# Patient Record
Sex: FEMALE | Race: WHITE | NOT HISPANIC OR LATINO | URBAN - METROPOLITAN AREA
[De-identification: names, ages, dates, MRNs, and addresses within clinical notes are randomized per-mention and may not be internally consistent; named-entity substitution may affect disease eponyms.]

---

## 2022-03-02 ENCOUNTER — EMERGENCY (EMERGENCY)
Facility: HOSPITAL | Age: 57
LOS: 1 days | Discharge: ANOTHER HOSPITAL | End: 2022-03-02
Attending: EMERGENCY MEDICINE | Admitting: EMERGENCY MEDICINE
Payer: COMMERCIAL

## 2022-03-02 ENCOUNTER — EMERGENCY (EMERGENCY)
Facility: HOSPITAL | Age: 57
LOS: 1 days | Discharge: HOME | End: 2022-03-02
Attending: EMERGENCY MEDICINE
Payer: COMMERCIAL

## 2022-03-02 VITALS
TEMPERATURE: 98 F | RESPIRATION RATE: 18 BRPM | SYSTOLIC BLOOD PRESSURE: 140 MMHG | HEART RATE: 84 BPM | OXYGEN SATURATION: 99 % | DIASTOLIC BLOOD PRESSURE: 76 MMHG

## 2022-03-02 VITALS
WEIGHT: 125 LBS | RESPIRATION RATE: 18 BRPM | OXYGEN SATURATION: 100 % | HEART RATE: 85 BPM | SYSTOLIC BLOOD PRESSURE: 139 MMHG | DIASTOLIC BLOOD PRESSURE: 82 MMHG | HEIGHT: 62 IN | TEMPERATURE: 98 F

## 2022-03-02 VITALS
HEART RATE: 85 BPM | TEMPERATURE: 99 F | RESPIRATION RATE: 18 BRPM | OXYGEN SATURATION: 99 % | DIASTOLIC BLOOD PRESSURE: 84 MMHG | SYSTOLIC BLOOD PRESSURE: 145 MMHG

## 2022-03-02 VITALS
RESPIRATION RATE: 18 BRPM | SYSTOLIC BLOOD PRESSURE: 144 MMHG | TEMPERATURE: 98 F | DIASTOLIC BLOOD PRESSURE: 89 MMHG | OXYGEN SATURATION: 97 % | HEART RATE: 74 BPM

## 2022-03-02 LAB
ALBUMIN SERPL ELPH-MCNC: 4 G/DL — SIGNIFICANT CHANGE UP (ref 3.3–5)
ALP SERPL-CCNC: 46 U/L — SIGNIFICANT CHANGE UP (ref 40–120)
ALT FLD-CCNC: 24 U/L — SIGNIFICANT CHANGE UP (ref 12–78)
ANION GAP SERPL CALC-SCNC: 6 MMOL/L — SIGNIFICANT CHANGE UP (ref 5–17)
APTT BLD: 30.5 SEC — SIGNIFICANT CHANGE UP (ref 27.5–35.5)
AST SERPL-CCNC: 22 U/L — SIGNIFICANT CHANGE UP (ref 15–37)
BASOPHILS # BLD AUTO: 0.05 K/UL — SIGNIFICANT CHANGE UP (ref 0–0.2)
BASOPHILS NFR BLD AUTO: 0.7 % — SIGNIFICANT CHANGE UP (ref 0–2)
BILIRUB SERPL-MCNC: 0.4 MG/DL — SIGNIFICANT CHANGE UP (ref 0.2–1.2)
BUN SERPL-MCNC: 15 MG/DL — SIGNIFICANT CHANGE UP (ref 7–23)
CALCIUM SERPL-MCNC: 9.5 MG/DL — SIGNIFICANT CHANGE UP (ref 8.5–10.1)
CHLORIDE SERPL-SCNC: 106 MMOL/L — SIGNIFICANT CHANGE UP (ref 96–108)
CO2 SERPL-SCNC: 28 MMOL/L — SIGNIFICANT CHANGE UP (ref 22–31)
CREAT SERPL-MCNC: 0.79 MG/DL — SIGNIFICANT CHANGE UP (ref 0.5–1.3)
EGFR: 89 ML/MIN/1.73M2 — SIGNIFICANT CHANGE UP
EOSINOPHIL # BLD AUTO: 0.07 K/UL — SIGNIFICANT CHANGE UP (ref 0–0.5)
EOSINOPHIL NFR BLD AUTO: 1 % — SIGNIFICANT CHANGE UP (ref 0–6)
GLUCOSE SERPL-MCNC: 99 MG/DL — SIGNIFICANT CHANGE UP (ref 70–99)
HCT VFR BLD CALC: 39.8 % — SIGNIFICANT CHANGE UP (ref 34.5–45)
HGB BLD-MCNC: 14 G/DL — SIGNIFICANT CHANGE UP (ref 11.5–15.5)
IMM GRANULOCYTES NFR BLD AUTO: 0.3 % — SIGNIFICANT CHANGE UP (ref 0–1.5)
INR BLD: 0.99 RATIO — SIGNIFICANT CHANGE UP (ref 0.88–1.16)
LYMPHOCYTES # BLD AUTO: 2.16 K/UL — SIGNIFICANT CHANGE UP (ref 1–3.3)
LYMPHOCYTES # BLD AUTO: 30.9 % — SIGNIFICANT CHANGE UP (ref 13–44)
MCHC RBC-ENTMCNC: 30.6 PG — SIGNIFICANT CHANGE UP (ref 27–34)
MCHC RBC-ENTMCNC: 35.2 GM/DL — SIGNIFICANT CHANGE UP (ref 32–36)
MCV RBC AUTO: 87.1 FL — SIGNIFICANT CHANGE UP (ref 80–100)
MONOCYTES # BLD AUTO: 0.47 K/UL — SIGNIFICANT CHANGE UP (ref 0–0.9)
MONOCYTES NFR BLD AUTO: 6.7 % — SIGNIFICANT CHANGE UP (ref 2–14)
NEUTROPHILS # BLD AUTO: 4.21 K/UL — SIGNIFICANT CHANGE UP (ref 1.8–7.4)
NEUTROPHILS NFR BLD AUTO: 60.4 % — SIGNIFICANT CHANGE UP (ref 43–77)
NRBC # BLD: 0 /100 WBCS — SIGNIFICANT CHANGE UP (ref 0–0)
PLATELET # BLD AUTO: 243 K/UL — SIGNIFICANT CHANGE UP (ref 150–400)
POTASSIUM SERPL-MCNC: 3.8 MMOL/L — SIGNIFICANT CHANGE UP (ref 3.5–5.3)
POTASSIUM SERPL-SCNC: 3.8 MMOL/L — SIGNIFICANT CHANGE UP (ref 3.5–5.3)
PROT SERPL-MCNC: 7.4 G/DL — SIGNIFICANT CHANGE UP (ref 6–8.3)
PROTHROM AB SERPL-ACNC: 11.6 SEC — SIGNIFICANT CHANGE UP (ref 10.5–13.4)
RAPID RVP RESULT: SIGNIFICANT CHANGE UP
RBC # BLD: 4.57 M/UL — SIGNIFICANT CHANGE UP (ref 3.8–5.2)
RBC # FLD: 13.1 % — SIGNIFICANT CHANGE UP (ref 10.3–14.5)
SARS-COV-2 RNA SPEC QL NAA+PROBE: SIGNIFICANT CHANGE UP
SODIUM SERPL-SCNC: 140 MMOL/L — SIGNIFICANT CHANGE UP (ref 135–145)
WBC # BLD: 6.98 K/UL — SIGNIFICANT CHANGE UP (ref 3.8–10.5)
WBC # FLD AUTO: 6.98 K/UL — SIGNIFICANT CHANGE UP (ref 3.8–10.5)

## 2022-03-02 PROCEDURE — 36415 COLL VENOUS BLD VENIPUNCTURE: CPT

## 2022-03-02 PROCEDURE — 70450 CT HEAD/BRAIN W/O DYE: CPT | Mod: 26,MA

## 2022-03-02 PROCEDURE — 70450 CT HEAD/BRAIN W/O DYE: CPT | Mod: MA

## 2022-03-02 PROCEDURE — 70486 CT MAXILLOFACIAL W/O DYE: CPT | Mod: MA

## 2022-03-02 PROCEDURE — 85610 PROTHROMBIN TIME: CPT

## 2022-03-02 PROCEDURE — 99285 EMERGENCY DEPT VISIT HI MDM: CPT

## 2022-03-02 PROCEDURE — 73110 X-RAY EXAM OF WRIST: CPT | Mod: 26,LT

## 2022-03-02 PROCEDURE — 0225U NFCT DS DNA&RNA 21 SARSCOV2: CPT

## 2022-03-02 PROCEDURE — 73110 X-RAY EXAM OF WRIST: CPT

## 2022-03-02 PROCEDURE — 73030 X-RAY EXAM OF SHOULDER: CPT

## 2022-03-02 PROCEDURE — 99284 EMERGENCY DEPT VISIT MOD MDM: CPT | Mod: 25

## 2022-03-02 PROCEDURE — 73030 X-RAY EXAM OF SHOULDER: CPT | Mod: 26,RT

## 2022-03-02 PROCEDURE — 85025 COMPLETE CBC W/AUTO DIFF WBC: CPT

## 2022-03-02 PROCEDURE — 70486 CT MAXILLOFACIAL W/O DYE: CPT | Mod: 26,MA

## 2022-03-02 PROCEDURE — 70450 CT HEAD/BRAIN W/O DYE: CPT | Mod: 26,MA,77

## 2022-03-02 PROCEDURE — 85730 THROMBOPLASTIN TIME PARTIAL: CPT

## 2022-03-02 PROCEDURE — 80053 COMPREHEN METABOLIC PANEL: CPT

## 2022-03-02 RX ORDER — ACETAMINOPHEN 500 MG
975 TABLET ORAL ONCE
Refills: 0 | Status: COMPLETED | OUTPATIENT
Start: 2022-03-02 | End: 2022-03-02

## 2022-03-02 RX ORDER — AMPICILLIN SODIUM AND SULBACTAM SODIUM 250; 125 MG/ML; MG/ML
3 INJECTION, POWDER, FOR SUSPENSION INTRAMUSCULAR; INTRAVENOUS ONCE
Refills: 0 | Status: COMPLETED | OUTPATIENT
Start: 2022-03-02 | End: 2022-03-02

## 2022-03-02 RX ADMIN — Medication 975 MILLIGRAM(S): at 10:03

## 2022-03-02 RX ADMIN — Medication 975 MILLIGRAM(S): at 12:20

## 2022-03-02 RX ADMIN — AMPICILLIN SODIUM AND SULBACTAM SODIUM 3 GRAM(S): 250; 125 INJECTION, POWDER, FOR SUSPENSION INTRAMUSCULAR; INTRAVENOUS at 12:40

## 2022-03-02 RX ADMIN — Medication 975 MILLIGRAM(S): at 15:59

## 2022-03-02 RX ADMIN — AMPICILLIN SODIUM AND SULBACTAM SODIUM 200 GRAM(S): 250; 125 INJECTION, POWDER, FOR SUSPENSION INTRAMUSCULAR; INTRAVENOUS at 12:07

## 2022-03-02 NOTE — ED PROVIDER NOTE - ENMT, MLM
NC/AT. EOMI, PERRL, VAI. No castro sign/otorrhea.  R infraorbital ecchymosis, minimal periorbital edema, no bony tend or crepitance

## 2022-03-02 NOTE — ED PROVIDER NOTE - OBJECTIVE STATEMENT
56yof no pmhx transfer from [place] for multiple facial fx and subdural s/p mechanical trip and fall 2 days ago. did not see anyone but with persistent pain. No nausea or vomiting. No chest pain or dizziness No LOC. no other injuries. ambulatory. No AC use

## 2022-03-02 NOTE — ED PROVIDER NOTE - PROGRESS NOTE DETAILS
Conner Gómez MD: patient seen and cleared by NSG after interval CTH showed resolution of SDH. Pt signed out to me pending OMFS recommendations. OMFS saw patient and rec'd Ophtho consultation. xray of wrist with possible distal radial fx. NO pain or swelling on exam FROM. all the ecchymosis to the dorsum of the 5th digit. FROM of the shoulder. Ophtho resident in room -PA Lesli Hodge Conner Gómez MD: XR with questionable distal radius fracture, but reassessed and patient has not pain or TTP to the distal radius, has normal passive and active and resisted ROM. No indication for immobilization at this time. May use removable wrist brace if patient starts to develop pain. F/u w/ Ortho for wrist/hand/shoulder. Patient reassessed and has improved symptoms. Repeat neuro exam is benign without any neuro deficits. Ambulatory in the ED without ataxia or assistance.

## 2022-03-02 NOTE — ED ADULT NURSE REASSESSMENT NOTE - NS ED NURSE REASSESS COMMENT FT1
Report received from PIA Trammell. Pt A+Ox3, VSS, cleared for d/c home. Pt d/c for follow up with neurology and maxillofacial surgery.

## 2022-03-02 NOTE — CONSULT NOTE ADULT - ASSESSMENT
INCOMPLETE NOTE, FINAL RECS TO FOLLOW    Assessment and Recommendations:  56y female w/ no significant PMH consulted for orbital floor fracture. On exam, patient's visual acuity was 20/20 in the right eye, 20/20 in the left eye. There was no APD. IOP were measured to be 16 right eye, 16 left eye. Extraocular movements, confrontational visual fields, and color plates were full in both eyes. Anterior segment exam with penlight revealed no acute abnormalities. Posterior segment exam with 20D lens after dilation revealed no acute abnormalities.   # Orbital floor fracture, right side  - VA intact, IOP wnl; EOMs full without signs of oculocardiac reflex; no diplopia  - Anterior segment & posterior segment exam unremarkable  - Appreciate OMFS, NSG, and trauma team   - Sinus precautions  - Abx per OMFS  - Findings and plan discussed with patient and primary team.    Outpatient follow-up: Patient should follow-up with his/her ophthalmologist or with [facility] Department of Ophthalmology at the address below     [address] [place] [address]. [address]  [place] [address]  [phone]     INCOMPLETE NOTE, FINAL RECS TO FOLLOW    Assessment and Recommendations:  56y female w/ no significant PMH consulted for orbital floor fracture. On exam, patient's visual acuity was 20/20 in the right eye, 20/20 in the left eye. There was no APD. IOP were measured to be 16 right eye, 16 left eye. Extraocular movements, confrontational visual fields, and color plates were full in both eyes. Anterior segment exam with penlight revealed no acute abnormalities. Posterior segment exam with 20D lens after dilation revealed no acute abnormalities.   # Complex facial fracture, right side  - VA intact, IOP wnl; EOMs full without signs of oculocardiac reflex; no diplopia  - Anterior segment & posterior segment exam unremarkable  - CT Maxillofacial with multiple right nasal bone fracture ZMC with inferior displacement right orbital floor fracture, right lamina fracture  - Appreciate OMFS, NSG, and trauma team   - Sinus precautions  - Cool compress  - Abx per OMFS  - Findings and plan discussed with patient and primary team.    Outpatient follow-up: Patient should follow-up with his/her ophthalmologist or with [facility] Department of Ophthalmology at the address below     [address]  [place] [address]  [phone]     Assessment and Recommendations:  56y female w/ no significant PMH consulted for orbital floor fracture. On exam, patient's visual acuity was 20/20 in the right eye, 20/20 in the left eye. There was no APD. IOP were measured to be 16 right eye, 16 left eye. Extraocular movements, confrontational visual fields, and color plates were full in both eyes. Anterior segment exam with penlight revealed no acute abnormalities. Posterior segment exam with 20D lens after dilation revealed no acute abnormalities.   # Complex facial fracture, right side  - VA intact, IOP wnl; EOMs full without signs of oculocardiac reflex; no diplopia  - Anterior segment & posterior segment exam unremarkable  - CT Maxillofacial with multiple right nasal bone fracture ZMC with inferior displacement right orbital floor fracture, right lamina fracture  - Appreciate OMFS, NSG, and trauma team   - Sinus precautions  - Cool compress  - Abx per OMFS  - Findings and plan discussed with patient and primary team.    Outpatient follow-up: Patient should follow-up with his/her ophthalmologist or with [facility] Department of Ophthalmology at the address below     [address]. [address]  [place] [address]  [phone]

## 2022-03-02 NOTE — ED ADULT NURSE NOTE - OBJECTIVE STATEMENT
56 y female transfer from [facility] presents to the ED c/ o fall.  pt is A&O x3 and VSS. Pt reports running to catch the garbage truck where she tripped on her slipper and fell on her "rock" steps 2 days ago.  pt denies LOC,  blood thinners and dizziness. Pt had no pain until today when she started having a headache and reports when she blows her nose she feels air going through her eye. Pt also reports blood tinged saliva when she spits. As per [facility] documentation pt has multiple facial fractures. Upon assessment pt has ecchymosis to right eye, bruising to left hand, pupils are equal and reactive, pt is able to move all extremities, respirations are equal and unlabored. Neuro flow sheet in chart. 56 y female transfer from [facility] presents to the ED c/ o fall.  pt is A&O x3 and VSS. Pt reports running to catch the garbage truck where she tripped on her slipper and fell on her "rock" steps 2 days ago.  pt denies LOC,  blood thinners and dizziness. Pt had no pain until today when she started having a headache and reports when she blows her nose she feels air going through her eye. Pt also reports blood tinged saliva when she spits. As per [facility] documentation pt has multiple facial fractures and a subdural hematoma. Upon assessment pt has ecchymosis to right eye, bruising to left hand, pupils are equal and reactive, pt is able to move all extremities, respirations are equal and unlabored. Neuro flow sheet in chart.

## 2022-03-02 NOTE — CONSULT NOTE ADULT - SUBJECTIVE AND OBJECTIVE BOX
p ([id number])     HPI:  56F xfer [facility], s/p mech fall 2da, +trauma to R cheek and periorbital regions. Mild h/a since this morning, no vomiting, not on any AC. Pt has mult facial fractures that will be managed by plastic surgery. No e/o CSF leak. CTH: Very tiny R SDH and read as R cribiform plate fx. Exam: AOx3, FC, PERRL, EOMI, no facial, 5/5 throughout, no drift      --Anticoagulation:    =====================  PAST MEDICAL HISTORY     PAST SURGICAL HISTORY         MEDICATIONS:  Antibiotics:    Neuro:    Other:      SOCIAL HISTORY:   Occupation:   Marital Status:     FAMILY HISTORY:      ROS: Negative except per HPI    LABS:

## 2022-03-02 NOTE — ED ADULT NURSE REASSESSMENT NOTE - NS ED NURSE REASSESS COMMENT FT1
neuro checks and vitals done (see paper neuro flow sheet in chart). complaining of headache - PA Nimka aware and ordering tylenol.

## 2022-03-02 NOTE — ED PROVIDER NOTE - CARE PROVIDER_API CALL
Johann Combs (MD)  Neurosurgery  [address], [address]  [place] [address]  Phone: [phone]  Fax: [phone]  Follow Up Time:     Rick Graham (DDS; MD)  OralMaxillofacial Surgery  [address] [place] [address]  Phone: [phone]  Fax: [phone]  Follow Up Time:

## 2022-03-02 NOTE — ED PROVIDER NOTE - CLINICAL SUMMARY MEDICAL DECISION MAKING FREE TEXT BOX
pt s/p fall with facial trauma pt s/p fall with facial trauma, multiple facial fractures, SDH, transferred to trauma at [facility], Dr. Callahan accepting.

## 2022-03-02 NOTE — CONSULT NOTE ADULT - ASSESSMENT
ASSESSMENT: Patient is a 56F s/p mechanical fall, p/w R SDH and facial fx    PLAN:    - Repeat CTH w/ no signs of SDH. Nsx reviewed imaging, no further intervention needed, outpt f/u  - Plastics for facial fx, recs appreciated  - Recommend R shoulder xray and L hand/wrist xray to r/o fx  - If no findings on xrays, no contraindication to discharge from Trauma Surgery standpoint  - Discussed w/ Trauma Surg attending, Dr. NEISHA Morin PGY-4  ACS team

## 2022-03-02 NOTE — ED PROVIDER NOTE - ATTENDING CONTRIBUTION TO CARE
56yof no pmhx transfer from [place] for multiple facial fx and subdural s/p mechanical trip and fall 2 days ago. pt was seen today with small sdh and facial fx led to transfer, gcs 15, non focal neuro exam, ns consulted for interval scan, plastics face consulted r periorbital ecchymosis, eomi.  pt is not on ac.

## 2022-03-02 NOTE — ED PROVIDER NOTE - PHYSICAL EXAMINATION
Gen: alert, NAD  HEENT:  R infraorbital swelling with ecchymosis, PERRLA, EOMI, no exophthalmos  CV:  well perfused, rrr   Pulm:  normal RR, I/E ratio and chest excursion  MSK: moving all extremities  Neuro:  non-focal  Skin:  visualized areas intact  Psych: AOx3

## 2022-03-02 NOTE — CONSULT NOTE ADULT - ASSESSMENT
NUHA GIBBONS  56F xfer PV, s/p mech fall 2da, +trauma to R cheek and periorbital regions. Mild h/a since this morning, no vomiting, not on any AC. Pt has mult facial fractures that will be managed by plastic surgery. No e/o CSF leak. CTH: Very tiny R SDH and read as R cribiform plate fx. Exam: AOx3, FC, PERRL, EOMI, no facial, 5/5 throughout, no drift  -No acute nsgy intervention  -3-4h repeat CTH. If stable, pt should f/u outpt w/ Dr. Combs in 1-2w from d/c.  -Pt instructed to look out for symptoms of CSF leak

## 2022-03-02 NOTE — CONSULT NOTE ADULT - SUBJECTIVE AND OBJECTIVE BOX
HPI: 56F with no significant PMH presents as a transfer from [place] after mechanical fall 2 days ago, landing on the right side of her face. Patient was self-treating with home remedies and did not seek medical attention until today after she blew her nose and felt air fill up in her cheeks by the bridge of the nose. Reports right eye pain and swelling improved from time of injury. Endorses waking up today with headaches. Denies LOC, fever, vomiting. Denies change in vision, double vision. No subjective changes in her occlusion.    PMH/PSH: No pertinent past medical history or past surgical history  Meds: None  Allergies: NKDA  Social History; (-) Tobacco, (-) EtOH, (-) IVDU    Review of Systems: Negative except per HPI  Constitutional: No fever, chills  Eyes: No blurry vision, double vision  Neuro: No tremors  Cardiovascular: No chest pain, palpitations  Respiratory: No SOB, no cough  GI: No nausea, vomiting, abdominal pain  : No dysuria  Skin: no rash  Psych: no depression  Endocrine: no polyuria, polydipsia  Heme/lymph: no swelling    Vital Signs Last 24 Hrs  T(C): 36.7 (02 Mar 2022 19:53), Max: 37.1 (02 Mar 2022 13:04)  T(F): 98.1 (02 Mar 2022 19:53), Max: 98.7 (02 Mar 2022 13:04)  HR: 74 (02 Mar 2022 19:53) (74 - 85)  BP: 144/89 (02 Mar 2022 19:53) (120/83 - 145/84)  BP(mean): --  RR: 18 (02 Mar 2022 19:53) (18 - 18)  SpO2: 97% (02 Mar 2022 19:53) (97% - 100%)    Physical Exam:  Gen: AAOx3, NAD, alert/interactive  CVS: RRR, S1, S2  Pulm: CTAB/L  Ext: FROM all extremities, peripheral pulses 2+    Maxillofacial Exam:  HEENT: NC/AT. EOMI, PERRL, VAI. No castro sign/otorrhea. Nares patent, nasal dorsum midline, (+) crepitus along R. nasolabial fold. Neck soft/supple.  EOE: (+) R. infraorbital ecchymosis, minimal periorbital edema, lateral brow/upper lid abrasion. No palpable step deformities. No maxillary mobility, no mandibular flexion. TMJ with FROM, no popping/clicking/pain.  IOE: Dentition intact, no gingival lacerations, MMM.    Labs:               14.0   6.98  )-----------( 243      ( 02 Mar 2022 12:12 )             39.8       03-02    140  |  106  |  15  ----------------------------<  99  3.8   |  28  |  0.79    Ca    9.5      02 Mar 2022 12:12    TPro  7.4  /  Alb  4.0  /  TBili  0.4  /  DBili  x   /  AST  22  /  ALT  24  /  AlkPhos  46  03-02        PT/INR - ( 02 Mar 2022 12:12 )   PT: 11.6 sec;   INR: 0.99 ratio     PTT - ( 02 Mar 2022 12:12 )  PTT:30.5 sec    CTMAXFACE/CTHEAD  FINDINGS:  VENTRICLES AND SULCI: Ventricles and sulci are unremarkable in size for   age.    INTRA-, EXTRA-AXIAL: Fracture right cribriform plate, with increased   attenuation right greater than left olfactory tracts and bulbs and   olfactory sulcus (maxillofacial 603:17, axial 2:93). Right frontotemporal   2.9 mm subdural collection with increased attenuation most pronounced   right lateral orbital roof greater sphenoid wing, in the right middle   cranial (601:28), with right orbital roof bony thinning and dehiscence   (603:17), with bifrontal 4.8 mm mixed density subdural collections   without mass effect. There is nonspecific white matter decreased   attenuation likely microvascular disease, there is no midline shift,   basal cisterns are patent.    CALVARIUM, FACIAL BONES, ORBITS:    Multiple fracture deformities including not limited to:  Fracture right nasal bone, medially displaced distal fragment (4:65) with   right nasal ala soft tissue swelling.    Fracture right superior medial orbital roof and lamina papyracea   extending to the right fovea ethmoidalis (4:93-4 91, 603:21), concerning   with involvement cribriform plate with dehiscence, (4:89, 604:27),   correlate for CSF leak.    Right zygomaticocomplex fracture with fractures right anterior inferior   orbital rim and orbital foramina extending 3 cm posteriorly with   inferiorly herniated orbital conal fat with fracture through right   posterior maxillary sinus margin (4:78),, right maxillary sinus   hemorrhagic fluid level with fracture extending to right pterygopalatine   fossa (4:78, 603:25, 604:22), and fracture right lateral orbital wall,   diastases right frontozygomatic suture (601:23-25), nondisplaced fracture   zygomatic arch (603:26), with intraorbital emphysema. The intraconal   orbital air abuts the dorsal right globe and upwardly displaces the right   optic nerve (603:20).    Fractures right infraorbital foramina likely affecting the right V2   maxillary nerve, enlargement right inferior rectus muscle  concerning for   hemorrhage, with loss of fat planes orbital conal margin (603:24), and   stranding intraconal orbital fat planes. Irregular right globe inferiorly   with air along the dorsal globe and soft tissue irregularity at the  inferior margin (2:78, 603:14), correlate with ophthalmologic to exclude   disruption, stranding fat planes with hemorrhage extending to right   medial canthus right lacrimal fossa, with right frontal preseptal   periorbital scalp soft tissue swelling.    There is inferior displacement of the right inferior orbital wall bony   fragment into the right maxillary sinus with right maxillary sinus   hemorrhagic fluid level, obstructed right ostiomeatal complex, the left   ostiomeatal complex is patent. Fracture right fovea ethmoidalis with   right ethmoid opacification concerning for CSF leak with hemorrhagic   fluid level right ethmoid sinuses (5:9, 2:90)    There is subcutaneous emphysema involving the right premaxillary soft   tissues and SMAS,  with  extension to right upper lip. Dental amalgam   streak artifact,  lucency  molar teeth  mandible and maxilla, dental   disease not excluded, the temporomandibular joints appear intact.    IMPRESSION:    Ventricles and sulci are unremarkable in size, there is a right   frontotemporal 2.9 mm subdural collection, and right cribriform plate   fracture with increased attenuation along the right greater left   olfactory tracts and bulbs (2:95), there is minimal microvascular   disease, there isno midline shift, the basal cisterns are patent.    Multiple right orbital and facial fractures as detailed above including   right nasal bone fracture ZMC with inferior displacement right orbital   floor fracture involving the right infraorbital foramina extending 3 cm   posteriorly through right posterior maxillary sinus margin to right   pterygopalatine fossa, right lamina fracture extends to right cribriform   plate, with hemorrhagic fluid levels right maxillary and ethmoid sinus,   right intraorbital emphysema, with stranding right orbital conal fat   planes and slight irregularity along the inferior globe margins globe   margins as detailed above.

## 2022-03-02 NOTE — ED PROVIDER NOTE - PATIENT PORTAL LINK FT
You can access the FollowMyHealth Patient Portal offered by [facility] by registering at the following website: [email or web address]. By joining [facility]’s FollowMyHealth portal, you will also be able to view your health information using other applications (apps) compatible with our system.

## 2022-03-02 NOTE — ED PROVIDER NOTE - PHYSICAL EXAMINATION
Gen: AAOx3, NAD, alert/interactive  CVS: RRR, S1, S2  Pulm: CTAB/L  Ext: FROM all extremities, peripheral pulses 2+    Maxillofacial Exam:  HEENT: NC/AT. EOMI, PERRL, VAI. No castro sign/otorrhea. Nares patent, nasal dorsum midline, (+) crepitus along R. nasolabial fold. Neck soft/supple.  EOE: (+) R. infraorbital ecchymosis, minimal periorbital edema, lateral brow/upper lid abrasion. No palpable step deformities. No maxillary mobility, no mandibular flexion. TMJ with FROM, no popping/clicking/pain.  IOE: Dentition intact, no gingival lacerations, MMM.

## 2022-03-02 NOTE — CONSULT NOTE ADULT - SUBJECTIVE AND OBJECTIVE BOX
[facility] DEPARTMENT OF OPHTHALMOLOGY - INITIAL ADULT CONSULT  -----------------------------------------------------------------------------------------------------------------  Nicko Mei MD  PGY 2  [phone]  -----------------------------------------------------------------------------------------------------------------    HPI: 56F with no significant PMH presents as a transfer from [place] after mechanical fall 2 days ago, landing on the right side of her face. Reports swelling and pain around the right eye which has improved. Denies change in vision, double vision, nausea, trouble moving the eyes, flashes/floaters.     PAST MEDICAL & SURGICAL HISTORY:  No pertinent past medical history      Past Ocular History: none  Ophthalmic Medications: none  FAMILY HISTORY:    Social History: ***    MEDICATIONS  (STANDING):    MEDICATIONS  (PRN):    Allergies & Intolerances:     Review of Systems:  Constitutional: No fever, chills  Eyes: No blurry vision, flashes, floaters, FBS, erythema, discharge, double vision, OU  Neuro: No tremors  Cardiovascular: No chest pain, palpitations  Respiratory: No SOB, no cough  GI: No nausea, vomiting, abdominal pain  : No dysuria  Skin: no rash  Psych: no depression  Endocrine: no polyuria, polydipsia  Heme/lymph: no swelling    VITALS: T(C): 36.7 (03-02-22 @ 15:45)  T(F): 98 (03-02-22 @ 15:45), Max: 98.7 (03-02-22 @ 13:04)  HR: 80 (03-02-22 @ 15:45) (79 - 85)  BP: 120/83 (03-02-22 @ 15:45) (120/83 - 145/84)  RR:  (18 - 18)  SpO2:  (99% - 100%)  Wt(kg): --  General: AAO x 3, appropriate mood and affect    Ophthalmology Exam:  Visual acuity (sc): 20/20 OU  Pupils: PERRL OU, no APD  Ttono: 16 OU  Extraocular movements (EOMs): Full OU, no pain, no diplopia  Confrontational Visual Field (CVF): Full OU  Color Plates: 12/12 OU    Pen Light Exam (PLE)  External: Flat OU  Lids/Lashes/Lacrimal Ducts: Periorbital ecchymosis and trace edema OD, Flat OS   Sclera/Conjunctiva: W+Q OU  Cornea: Cl OU  Anterior Chamber: D+F OU    Iris: Flat OU  Lens: Cl OU    Fundus Exam: dilated with 1% tropicamide and 2.5% phenylephrine  Approval obtained from primary team for dilation  Patient aware that pupils can remained dilated for at least 4-6 hours  Exam performed with 20D lens    Vitreous: wnl OU  Disc, cup/disc: sharp and pink, 0.4 OU  Macula: wnl OU  Vessels: wnl OU  Periphery: wnl OU    Labs/Imaging:    < from: CT Head No Cont (03.02.22 @ 14:04) >    ACC: [id number] EXAM:  CT BRAIN                          PROCEDURE DATE:  03/02/2022          INTERPRETATION:  Noncontrast CT of the brain.    CLINICAL INDICATION:  Follow-up for subdural hematoma    TECHNIQUE : Axial CT scanning of the brain was obtained from the skull   base to the vertex without the administration of intravenous contrast.    COMPARISON: Brain CT dated 3/2/2022    FINDINGS:  Previously seen right-sided subdural hematoma not well   appreciated on the current examination. No midline shift. No acute   hemorrhage or hydrocephalus.    Right orbital floor fracture is again identified.    Right maxillary sinus mucosal hemorrhage.    IMPRESSION::    Previously identified right subdural hematoma no longer appreciated.    --- End of Report ---     [facility] DEPARTMENT OF OPHTHALMOLOGY - INITIAL ADULT CONSULT  -----------------------------------------------------------------------------------------------------------------  Nicko Mei MD  PGY 2  [phone]  -----------------------------------------------------------------------------------------------------------------    HPI: 56F with no significant PMH presents as a transfer from [place] after mechanical fall 2 days ago, landing on the right side of her face. Reports swelling and pain around the right eye which has improved. Denies change in vision, double vision, nausea, trouble moving the eyes, flashes/floaters.     PAST MEDICAL & SURGICAL HISTORY:  No pertinent past medical history      Past Ocular History: none  Ophthalmic Medications: none  FAMILY HISTORY:    Social History: denies etoh/tobacco    MEDICATIONS  (STANDING):    MEDICATIONS  (PRN):    Allergies & Intolerances:     Review of Systems:  Constitutional: No fever, chills  Eyes: No blurry vision, flashes, floaters, FBS, erythema, discharge, double vision, OU  Neuro: No tremors  Cardiovascular: No chest pain, palpitations  Respiratory: No SOB, no cough  GI: No nausea, vomiting, abdominal pain  : No dysuria  Skin: no rash  Psych: no depression  Endocrine: no polyuria, polydipsia  Heme/lymph: no swelling    VITALS: T(C): 36.7 (03-02-22 @ 15:45)  T(F): 98 (03-02-22 @ 15:45), Max: 98.7 (03-02-22 @ 13:04)  HR: 80 (03-02-22 @ 15:45) (79 - 85)  BP: 120/83 (03-02-22 @ 15:45) (120/83 - 145/84)  RR:  (18 - 18)  SpO2:  (99% - 100%)  Wt(kg): --  General: AAO x 3, appropriate mood and affect    Ophthalmology Exam:  Visual acuity (sc): 20/20 OU  Pupils: PERRL OU, no APD  Ttono: 16 OU  Extraocular movements (EOMs): Full OU, no pain, no diplopia  Confrontational Visual Field (CVF): Full OU  Color Plates: 12/12 OU    Pen Light Exam (PLE)  External: Flat OU  Lids/Lashes/Lacrimal Ducts: Periorbital ecchymosis and trace edema OD, Flat OS   Sclera/Conjunctiva: W+Q OU  Cornea: Cl OU  Anterior Chamber: D+F OU    Iris: Flat OU  Lens: Cl OU    Fundus Exam: dilated with 1% tropicamide and 2.5% phenylephrine  Approval obtained from primary team for dilation  Patient aware that pupils can remained dilated for at least 4-6 hours  Exam performed with 20D lens    Vitreous: wnl OU  Disc, cup/disc: sharp and pink, 0.4 OU  Macula: wnl OU  Vessels: wnl OU  Periphery: wnl OU    Labs/Imaging:    IMPRESSION:    Ventricles and sulci are unremarkable in size, there is a right   frontotemporal 2.9 mm subdural collection, and right cribriform plate   fracture with increased attenuation along the right greater left   olfactory tracts and bulbs (2:95), there is minimal microvascular   disease, there is no midline shift, the basal cisterns are patent.    Multiple right orbital and facial fractures as detailed above including   right nasal bone fracture [facility] with inferior displacement right orbital   floor fracture involving the right infraorbital foramina extending 3 cm   posteriorly through right posterior maxillary sinus margin to right   pterygopalatine fossa, right lamina fracture extends to right cribriform   plate, with hemorrhagic fluid levels right maxillary and ethmoid sinus,   right intraorbital emphysema, with stranding right orbital conal fat   planes and slight irregularity along the inferior globe margins globe   margins as detailed above.    Findings discussed with Dr. Hartley immediate time of review 3/2/2022 at   10:50 AM.    --- End of Report ---            ORTIZ COLLADO MD; Attending Radiologist  This document has been electronically signed. Mar  2 2022 11:44AM [facility] DEPARTMENT OF OPHTHALMOLOGY - INITIAL ADULT CONSULT  -----------------------------------------------------------------------------------------------------------------  Nicko Mei MD  PGY 2  [phone]  -----------------------------------------------------------------------------------------------------------------    HPI: 56F with no significant PMH presents as a transfer from [place] after mechanical fall 2 days ago, landing on the right side of her face. Reports swelling and pain around the right eye which has improved. Denies change in vision, double vision, nausea, trouble moving the eyes, flashes/floaters.     PAST MEDICAL & SURGICAL HISTORY:  No pertinent past medical history      Past Ocular History: none  Ophthalmic Medications: none  FAMILY HISTORY:    Social History: denies etoh/tobacco    MEDICATIONS  (STANDING):    MEDICATIONS  (PRN):    Allergies & Intolerances:     Review of Systems:  Constitutional: No fever, chills  Eyes: No blurry vision, flashes, floaters, FBS, erythema, discharge, double vision, OU  Neuro: No tremors  Cardiovascular: No chest pain, palpitations  Respiratory: No SOB, no cough  GI: No nausea, vomiting, abdominal pain  : No dysuria  Skin: no rash  Psych: no depression  Endocrine: no polyuria, polydipsia  Heme/lymph: no swelling    VITALS: T(C): 36.7 (03-02-22 @ 15:45)  T(F): 98 (03-02-22 @ 15:45), Max: 98.7 (03-02-22 @ 13:04)  HR: 80 (03-02-22 @ 15:45) (79 - 85)  BP: 120/83 (03-02-22 @ 15:45) (120/83 - 145/84)  RR:  (18 - 18)  SpO2:  (99% - 100%)  Wt(kg): --  General: AAO x 3, appropriate mood and affect    Ophthalmology Exam:  Visual acuity (sc): 20/20 OU  Pupils: PERRL OU, no APD  Ttono: 16 OU  Extraocular movements (EOMs): Full OU, no pain, no diplopia  Confrontational Visual Field (CVF): Full OU  Color Plates: 12/12 OU    Pen Light Exam (PLE)  External: Flat OU  Lids/Lashes/Lacrimal Ducts: Periorbital ecchymosis and trace edema OD, Flat OS   Sclera/Conjunctiva: W+Q OU  Cornea: Cl OU  Anterior Chamber: D+F OU    Iris: Flat OU  Lens: Cl OU    Fundus Exam: dilated with 1% tropicamide and 2.5% phenylephrine  Approval obtained from primary team for dilation  Patient aware that pupils can remained dilated for at least 4-6 hours  Exam performed with 20D lens    Vitreous: wnl OU  Disc, cup/disc: sharp and pink, 0.2 OU  Macula: wnl OU  Vessels: wnl OU  Periphery: wnl OU    Labs/Imaging:    IMPRESSION:    Ventricles and sulci are unremarkable in size, there is a right   frontotemporal 2.9 mm subdural collection, and right cribriform plate   fracture with increased attenuation along the right greater left   olfactory tracts and bulbs (2:95), there is minimal microvascular   disease, there is no midline shift, the basal cisterns are patent.    Multiple right orbital and facial fractures as detailed above including   right nasal bone fracture [facility] with inferior displacement right orbital   floor fracture involving the right infraorbital foramina extending 3 cm   posteriorly through right posterior maxillary sinus margin to right   pterygopalatine fossa, right lamina fracture extends to right cribriform   plate, with hemorrhagic fluid levels right maxillary and ethmoid sinus,   right intraorbital emphysema, with stranding right orbital conal fat   planes and slight irregularity along the inferior globe margins globe   margins as detailed above.    Findings discussed with Dr. Hartley immediate time of review 3/2/2022 at   10:50 AM.    --- End of Report ---            ORTIZ COLLADO MD; Attending Radiologist  This document has been electronically signed. Mar  2 2022 11:44AM

## 2022-03-02 NOTE — ED ADULT NURSE NOTE - NSIMPLEMENTINTERV_GEN_ALL_ED
Implemented All Fall with Harm Risk Interventions:  Page to call system. Call bell, personal items and telephone within reach. Instruct patient to call for assistance. Room bathroom lighting operational. Non-slip footwear when patient is off stretcher. Physically safe environment: no spills, clutter or unnecessary equipment. Stretcher in lowest position, wheels locked, appropriate side rails in place. Provide visual cue, wrist band, yellow gown, etc. Monitor gait and stability. Monitor for mental status changes and reorient to person, place, and time. Review medications for side effects contributing to fall risk. Reinforce activity limits and safety measures with patient and family. Provide visual clues: red socks.

## 2022-03-02 NOTE — ED ADULT NURSE NOTE - CHPI ED NUR SYMPTOMS NEG
no blurred vision/no change in level of consciousness/no confusion/no dizziness/no loss of consciousness/no seizure/no syncope/no vomiting/no weakness

## 2022-03-02 NOTE — ED ADULT NURSE NOTE - OBJECTIVE STATEMENT
pt aox4, " fell on Monday, rt orbital area ecchymosis, tender to touch"  moderate headache,  no n/v, good vision, denies neck pain, FROM 4 extremities, ARIA

## 2022-03-02 NOTE — CONSULT NOTE ADULT - ASSESSMENT
56F with non-displaced R. orbital floor fracture, R. nasal bone fracture s/p mechanical fall on face.  - No acute OMFS intervention indicated  - Augmentin 875/125 BID x7 days  - Sinus precautions (no nose blowing, sneeze with mouth open, no heavy lifting, no valsalva during bowel movements)  - Please have patient call [facility] OMFS/Dental clinic to schedule 1-week outpatient follow-up appointment 56F with non-displaced R. orbital floor fracture, R. nasal bone fracture s/p mechanical fall on face.  - No acute OMFS intervention indicated  - Augmentin 875/125 BID x7 days  - Sinus precautions (no nose blowing, sneeze with mouth open, no heavy lifting, no valsalva during bowel movements)  - Pain meds per ED  - Recommend OPHTHO consult prior to discharge  - Please have patient call [facility] OMFS/Dental clinic to schedule 1-week outpatient follow-up appointment

## 2022-03-02 NOTE — ED PROVIDER NOTE - NSFOLLOWUPINSTRUCTIONS_ED_ALL_ED_FT
DO NOT blow your nose for at least two weeks.  DO NOT forcibly spit for one week.  DO NOT smoke or use smokeless tobacco; smoking greatly inhibits the healing process, especially in the sinuses.  Sneeze with your MOUTH OPEN. If the urge to sneeze arises, do not sneeze through your nose and avoid pinching nostrils.  Drink without a straw for one week.  Avoid swimming for one month and strenuous exercise (e.g. heavy lifting) for one week.  Gentle swishing with salt water may be done for one week, but do not rinse vigorously.  Slight bleeding from the nose is not uncommon and may occur for several days after surgery.    You need to follow up with: Call this week to schedule your appointment.     Johann Combs)  Neurosurgery  [address] [place] [address], [address]  [place] [address]  Phone: [phone]    Rick Graham (KRISTYN; MD)  OralMaxillofacial Surgery  [address] [place] [id number]  Phone: [phone]        Return to the ER for worsening pain, swelling, vision changes, headache, vomiting, confusion, speech or gait changes or any other concerns.

## 2022-03-02 NOTE — CONSULT NOTE ADULT - SUBJECTIVE AND OBJECTIVE BOX
TRAUMA SERVICE (Acute Care Surgery / [facility] - #[id number]) - CONSULT NOTE  --------------------------------------------------------------------------------------------    Patient is a 56y old  Female who presents with a chief complaint of fall    HPI: 56F xfer from [facility], s/p mechanical fall, tripped over 1 step outside of her house this AM, landing on her R face. PT went to [facility], CTH showed 2.9mm R side SDH along w/ R cribriform plate fx. Pt transferred to [facility] for trauma surgery eval      Primary Survey:    A - airway intact  B - bilateral breath sounds and good chest rise  C - initial BP  BP: 120/83 (03-02-22 @ 15:45) *** , HR HR: 80 (03-02-22 @ 15:45) *** , palpable pulses in all extremities  D - GCS 15 on arrival  Exposure obtained    Secondary Survey: *  General: NAD  HEENT: Normocephalic, R periorbital edema and ecchymosis EOMI, PEERLA.  Neck: Soft, midline trachea.  Chest: No chest wall tenderness.   Cardiac: S1, S2, RRR  Respiratory: Bilateral breath sounds, clear and equal bilaterally  Abdomen: Soft, non-distended, non-tender, no rebound, no guarding, no masses palpated  Groin: Normal appearing  Ext: palp radial b/l UE, b/l DP palp in Lower Extrem, motor and sensory grossly intact in all 4 extremities. R shoulder pain on passive motion, L dorsum of hand w/ bruise tender  Back: no TTP, no palpable runoff/stepoff/deformity    Patient denies fevers/chills, denies lightheadedness/dizziness, denies SOB/chest pain, denies nausea/vomiting, denies constipation/diarrhea.  *    ROS: 10-system review is otherwise negative except HPI above.      PAST MEDICAL & SURGICAL HISTORY:  No pertinent past medical history      FAMILY HISTORY:    [] Family history not pertinent as reviewed with the patient and family    SOCIAL HISTORY:  ***    ALLERGIES: No Known Allergies      HOME MEDICATIONS: ***    CURRENT MEDICATIONS  MEDICATIONS (STANDING):   MEDICATIONS (PRN):  --------------------------------------------------------------------------------------------    Vitals:   T(C): 36.7 (03-02-22 @ 15:45), Max: 37.1 (03-02-22 @ 13:04)  HR: 80 (03-02-22 @ 15:45) (79 - 85)  BP: 120/83 (03-02-22 @ 15:45) (120/83 - 145/84)  RR: 18 (03-02-22 @ 15:45) (18 - 18)  SpO2: 99% (03-02-22 @ 15:45) (99% - 100%)  CAPILLARY BLOOD GLUCOSE        CAPILLARY BLOOD GLUCOSE          Height (cm): 157.5 (03-02 @ 09:35)  Weight (kg): 56.7 (03-02 @ 09:35)  BMI (kg/m2): 22.9 (03-02 @ 09:35)  BSA (m2): 1.57 (03-02 @ 09:35)    --------------------------------------------------------------------------------------------    LABS  CBC (03-02 @ 12:12)                              14.0                           6.98    )----------------(  243        60.4  % Neutrophils, 30.9  % Lymphocytes, ANC: 4.21                                39.8      BMP (03-02 @ 12:12)             140     |  106     |  15    		Ca++ --      Ca 9.5                ---------------------------------( 99    		Mg --                 3.8     |  28      |  0.79  			Ph --        LFTs (03-02 @ 12:12)      TPro 7.4 / Alb 4.0 / TBili 0.4 / DBili -- / AST 22 / ALT 24 / AlkPhos 46    Coags (03-02 @ 12:12)  aPTT 30.5 / INR 0.99 / PT 11.6          --------------------------------------------------------------------------------------------    MICROBIOLOGY      --------------------------------------------------------------------------------------------    IMAGING  ***    --------------------------------------------------------------------------------------------    < from: CT Head No Cont (03.02.22 @ 14:04) >    ACC: [id number] EXAM:  CT BRAIN                          PROCEDURE DATE:  03/02/2022          INTERPRETATION:  Noncontrast CT of the brain.    CLINICAL INDICATION:  Follow-up for subdural hematoma    TECHNIQUE : Axial CT scanning of the brain was obtained from the skull   base to the vertex without the administration of intravenous contrast.    COMPARISON: Brain CT dated 3/2/2022    FINDINGS:  Previously seen right-sided subdural hematoma not well   appreciated on the current examination. No midline shift. No acute   hemorrhage or hydrocephalus.    Right orbital floor fracture is again identified.    Right maxillary sinus mucosal hemorrhage.    IMPRESSION::    Previously identified right subdural hematoma no longer appreciated.    --- End of Report ---            JESSICA PETERS MD; Attending Radiologist  This document has been electronically signed. Mar  2 2022  3:59PM    < end of copied text >

## 2022-03-03 PROBLEM — Z78.9 OTHER SPECIFIED HEALTH STATUS: Chronic | Status: ACTIVE | Noted: 2022-03-02

## 2022-03-03 NOTE — ED POST DISCHARGE NOTE - RESULT SUMMARY
XRAY WRIST: no acute fx or dislocation. Per progress note "XR with questionable distal radius fracture, but reassessed and patient has not pain or TTP to the distal radius, has normal passive and active and resisted ROM. No indication for immobilization at this time. May use removable wrist brace if patient starts to develop pain. F/u w/ Ortho for wrist/hand/shoulder." Given ED did not believe there was a fracture despite prelim no indication to contact pt as she was not splinted in ED. - Amparo Hutchison PA-C

## 2022-03-04 PROBLEM — Z00.00 ENCOUNTER FOR PREVENTIVE HEALTH EXAMINATION: Status: ACTIVE | Noted: 2022-03-04

## 2022-03-08 ENCOUNTER — APPOINTMENT (OUTPATIENT)
Dept: NEUROSURGERY | Facility: CLINIC | Age: 57
End: 2022-03-08
Payer: COMMERCIAL

## 2022-03-08 DIAGNOSIS — S06.5X9A TRAUMATIC SUBDURAL HEMORRHAGE WITH LOSS OF CONSCIOUSNESS OF UNSPECIFIED DURATION, INITIAL ENCOUNTER: ICD-10-CM

## 2022-03-08 DIAGNOSIS — S02.92XA UNSPECIFIED FRACTURE OF FACIAL BONES, INITIAL ENCOUNTER FOR CLOSED FRACTURE: ICD-10-CM

## 2022-03-08 PROCEDURE — 99213 OFFICE O/P EST LOW 20 MIN: CPT

## 2022-03-11 NOTE — HISTORY OF PRESENT ILLNESS
[de-identified] : The patient was recently seen in the ER after transferred from [facility] for  multiple facial fracture and subdural hematoma after a mechanical trip and fall. CT scan showed  a right frontotemporal 2.9 mm subdural collection, and multiple right orbital and facial fractures. Repeated CTH showed resolution of subdural collection. No acute neurosurgical intervention rendered. She was discharged home.\par \par Today, she reports she is having HA at times and tiredness in left eye. NO other neurological complaints

## 2022-03-11 NOTE — ASSESSMENT
[FreeTextEntry1] : 56 years old woman with a fall sustaining multiple facial fractures and small subdural hematoma at right frontotemporal region. Neurologically intact\par \par Plan:\par - Follow up with oral & maxillofacials surgeon regarding facial fracture\par - No further neurosurgical visit needed

## 2022-03-11 NOTE — PHYSICAL EXAM
[General Appearance - Alert] : alert [General Appearance - In No Acute Distress] : in no acute distress [General Appearance - Well Nourished] : well nourished [General Appearance - Well Developed] : well developed [Oriented To Time, Place, And Person] : oriented to person, place, and time [Impaired Insight] : insight and judgment were intact [Affect] : the affect was normal [Mood] : the mood was normal [Cranial Nerves Optic (II)] : visual acuity intact bilaterally,  pupils equal round and reactive to light [Cranial Nerves Oculomotor (III)] : extraocular motion intact [Cranial Nerves Trigeminal (V)] : facial sensation intact symmetrically [Cranial Nerves Facial (VII)] : face symmetrical [Cranial Nerves Vestibulocochlear (VIII)] : hearing was intact bilaterally [Cranial Nerves Glossopharyngeal (IX)] : tongue and palate midline [Cranial Nerves Accessory (XI - Cranial And Spinal)] : head turning and shoulder shrug symmetric [Motor Tone] : muscle tone was normal in all four extremities [Motor Strength] : muscle strength was normal in all four extremities [Involuntary Movements] : no involuntary movements were seen [No Muscle Atrophy] : normal bulk in all four extremities [Balance] : balance was intact [2+] : Patella left 2+ [No Visual Abnormalities] : no visible abnormalities [Sclera] : the sclera and conjunctiva were normal [PERRL With Normal Accommodation] : pupils were equal in size, round, reactive to light, with normal accommodation [Extraocular Movements] : extraocular movements were intact [Outer Ear] : the ears and nose were normal in appearance [Hearing Threshold Finger Rub Not Cross] : hearing was normal [Neck Appearance] : the appearance of the neck was normal [Neck Cervical Mass (___cm)] : no neck mass was observed [] : no respiratory distress [Exaggerated Use Of Accessory Muscles For Inspiration] : no accessory muscle use [Heart Rate And Rhythm] : heart rate was normal and rhythm regular [Edema] : there was no peripheral edema [Abdomen Soft] : soft [Abdomen Tenderness] : non-tender [No Spinal Tenderness] : no spinal tenderness [Abnormal Walk] : normal gait [Nail Clubbing] : no clubbing  or cyanosis of the fingernails [Musculoskeletal - Swelling] : no joint swelling seen [Skin Color & Pigmentation] : normal skin color and pigmentation [Skin Turgor] : normal skin turgor [FreeTextEntry1] : bruises around right eye [Limited Balance] : balance was intact [Tremor] : no tremor present

## 2022-03-15 ENCOUNTER — APPOINTMENT (OUTPATIENT)
Dept: NEUROSURGERY | Facility: CLINIC | Age: 57
End: 2022-03-15

## 2022-04-18 ENCOUNTER — APPOINTMENT (OUTPATIENT)
Dept: OPHTHALMOLOGY | Facility: CLINIC | Age: 57
End: 2022-04-18

## 2022-04-25 ENCOUNTER — APPOINTMENT (OUTPATIENT)
Dept: ORTHOPEDIC SURGERY | Facility: CLINIC | Age: 57
End: 2022-04-25
Payer: COMMERCIAL

## 2022-04-25 VITALS — WEIGHT: 129 LBS | BODY MASS INDEX: 23.74 KG/M2 | HEIGHT: 62 IN

## 2022-04-25 DIAGNOSIS — M75.121 COMPLETE ROTATOR CUFF TEAR OR RUPTURE OF RIGHT SHOULDER, NOT SPECIFIED AS TRAUMATIC: ICD-10-CM

## 2022-04-25 DIAGNOSIS — M24.011 LOOSE BODY IN RIGHT SHOULDER: ICD-10-CM

## 2022-04-25 PROCEDURE — 20611 DRAIN/INJ JOINT/BURSA W/US: CPT

## 2022-04-25 PROCEDURE — 99204 OFFICE O/P NEW MOD 45 MIN: CPT | Mod: 25

## 2022-04-25 PROCEDURE — J3490M: CUSTOM

## 2022-04-25 PROCEDURE — 73010 X-RAY EXAM OF SHOULDER BLADE: CPT | Mod: LT

## 2022-04-25 PROCEDURE — 73030 X-RAY EXAM OF SHOULDER: CPT | Mod: LT

## 2022-04-25 NOTE — PROCEDURE
[FreeTextEntry3] : \par Injection Procedure Note:\par \par The risks, benefits, and alternatives to corticosteroid injection were reviewed with the patient.  Risks outlined include but are not limited to infection, sepsis, bleeding, scarring, skin discoloration, temporary increase in pain, syncopal episode, failure to resolve symptoms, symptoms recurrence, allergic reaction, flare reaction, and elevation of blood sugar in diabetics.  Patient understood the risks and asked to proceed with this treatment course.\par \par Patient Identification\par Name/: Verbal with patient and/or family\par \par Procedure Verification:\par Procedure confirmed with patient or family/designee\par Consent for procedure: Verbal Consent Given\par Relevant documentation completed, reviewed, and signed\par Clinical indications for procedure confirmed\par \par Time-out with all members of procedure team immediately prior to procedure:\par Correct patient identified. Agreement on procedure. Correct side and site.\par \par ULTRASOUND GUIDED SHOULDER SUBACROMIAL INJECTION - RIGHT\par After verbal consent and identification of the correct patient and correct site, the posterior right shoulder was prepped using alcohol swabs and betadine. This was allowed time to air dry. After ethyl choride spray for skin anesthesia, a mixture of 1cc DepoMedrol 40mg/ml, 3cc Lidocaine 1%, and 3cc Bupivacaine 0.5% was injected under ultrasound guidance into the subacromial space from posterior using a sterile 22G needle. Visualization of the needle and placement of the injection was performed without any complications.  Ultrasound was used for visualization, precise injection in area of tear / calcific density, and / or prior failure or difficult injection.  The patient tolerated the procedure well. After-care instructions were provided and included instructions to ice the area and to call if redness, pain, or fever develop.\par

## 2022-04-25 NOTE — HISTORY OF PRESENT ILLNESS
[de-identified] : 56 year old female  (RHD,  licenced . )  right  shoulder pain beginning on 3/14/2022 when fell at home.  \par left shoulder chronic pain worse with OH activtiy.\par Right p ain is anteiror and lateral and deep assoc with weakness, worse with OH activitiy.  has done ice, nsaids, activiy modficiaon.\par \par ***has  had reactions to Lidocaine in the past***

## 2022-04-25 NOTE — ASSESSMENT
[FreeTextEntry1] : Bilateral X-Ray Examination of the SHOULDER 2 views:  no fractures, subluxations or dislocations. Right shoulder loose body inferiorly.\par X-Ray Examination of the SCAPULA 1 or 2 views shows: there is an acromial spurs\par \par right injury with weakness and quest LB, left chronic imp\par \par - We discussed their diagnosis and treatment options at length. \par - We will obtain an MRI to evaluate the integrity of the rotator cuff tissue and possible need for surgery, given their traumatic injury along with weakness on exam and positive karly testing.\par - Follow up after MRI to discuss results and further discuss treatment options.\par - R CSI\par - In the meantime, the patient was advised to apply ice to the area daily, use anti-inflammatory medication, and let pain guide their activities.\par

## 2022-04-25 NOTE — IMAGING
[de-identified] : \par RIGHT SHOULDER\par Inspection: No swelling. \par Palpation: Tenderness is noted at the bicipital groove, anterior and lateral. \par Range of motion: There is pain with range of motion.\par , ER 55, @90ER 90, @90IR 30\par Strength: There is pain, weakness, and discomfort with strength testing.\par Forward Flexion 3/5. Abduction 3/5.  External Rotation 4/5 and Internal Rotation 5-/5 \par Neurological testings: motor and sensor intact distally.\par Ligament Stability and Special Tests: \par There is positive arc of pain. \par Shoulder apprehension: neg\par Shoulder relocation: neg\par Briceño’s test: pos\par Biceps Active test: neg\par Suggs Labral Shear: neg\par Impingement testing: pos\par Jaylin testing: pos\par Whipple: pos\par Cross Body Adduction: neg\par \par \par  LEFT SHOULDER\par Inspection: No swelling. \par Palpation: Tenderness is noted at the bicipital groove, anterior and lateral. \par Range of motion: There is pain with range of motion.\par , ER 55, @90ER 90, @90IR 30\par Strength: There is pain and discomfort with strength testing.\par Forward Flexion 4/5. Abduction 4/5.  External Rotation 5-/5 and Internal Rotation 5-/5 \par Neurological testings: motor and sensor intact distally.\par Ligament Stability and Special Tests: \par There is positive arc of pain. \par Shoulder apprehension: neg\par Shoulder relocation: neg\par Briceño’s test: pos\par Biceps Active test: neg\par Suggs Labral Shear: neg\par Impingement testing: pos\par Jaylin testing: pos\par Whipple: pos\par Cross Body Adduction: neg\par

## 2022-04-29 ENCOUNTER — APPOINTMENT (OUTPATIENT)
Dept: MRI IMAGING | Facility: CLINIC | Age: 57
End: 2022-04-29

## 2022-05-05 ENCOUNTER — APPOINTMENT (OUTPATIENT)
Dept: ORTHOPEDIC SURGERY | Facility: CLINIC | Age: 57
End: 2022-05-05

## 2022-05-08 ENCOUNTER — FORM ENCOUNTER (OUTPATIENT)
Age: 57
End: 2022-05-08

## 2022-05-09 ENCOUNTER — APPOINTMENT (OUTPATIENT)
Dept: MRI IMAGING | Facility: CLINIC | Age: 57
End: 2022-05-09
Payer: COMMERCIAL

## 2022-05-09 PROCEDURE — 73221 MRI JOINT UPR EXTREM W/O DYE: CPT | Mod: RT

## 2022-05-18 PROBLEM — M75.21 BICEPS TENDONITIS, RIGHT: Status: ACTIVE | Noted: 2022-05-18

## 2022-05-18 PROBLEM — S43.431A TEAR OF RIGHT GLENOID LABRUM, INITIAL ENCOUNTER: Status: ACTIVE | Noted: 2022-05-18

## 2022-05-18 PROBLEM — S43.101A SEPARATION OF ACROMIOCLAVICULAR JOINT, RIGHT, INITIAL ENCOUNTER: Status: ACTIVE | Noted: 2022-05-18

## 2022-05-18 PROBLEM — M75.42 IMPINGEMENT SYNDROME OF LEFT SHOULDER: Status: ACTIVE | Noted: 2022-04-25

## 2022-05-18 PROBLEM — M75.41 IMPINGEMENT SYNDROME OF RIGHT SHOULDER: Status: ACTIVE | Noted: 2022-04-25

## 2022-05-19 ENCOUNTER — APPOINTMENT (OUTPATIENT)
Dept: ORTHOPEDIC SURGERY | Facility: CLINIC | Age: 57
End: 2022-05-19
Payer: COMMERCIAL

## 2022-05-19 VITALS — BODY MASS INDEX: 23.74 KG/M2 | WEIGHT: 129 LBS | HEIGHT: 62 IN

## 2022-05-19 DIAGNOSIS — M75.42 IMPINGEMENT SYNDROME OF LEFT SHOULDER: ICD-10-CM

## 2022-05-19 DIAGNOSIS — M75.21 BICIPITAL TENDINITIS, RIGHT SHOULDER: ICD-10-CM

## 2022-05-19 DIAGNOSIS — S43.101A UNSPECIFIED DISLOCATION OF RIGHT ACROMIOCLAVICULAR JOINT, INITIAL ENCOUNTER: ICD-10-CM

## 2022-05-19 DIAGNOSIS — S43.431A SUPERIOR GLENOID LABRUM LESION OF RIGHT SHOULDER, INITIAL ENCOUNTER: ICD-10-CM

## 2022-05-19 DIAGNOSIS — M75.41 IMPINGEMENT SYNDROME OF RIGHT SHOULDER: ICD-10-CM

## 2022-05-19 PROCEDURE — 99214 OFFICE O/P EST MOD 30 MIN: CPT

## 2022-05-19 NOTE — HISTORY OF PRESENT ILLNESS
[de-identified] : 56 year old female  (RHD,  licenced . )  right  shoulder pain beginning on 3/14/2022 when fell at home.  \par left shoulder chronic pain worse with OH activtiy.\par Right pain is anteiror and lateral and deep assoc with weakness, worse with OH activitiy.  has done ice, nsaids, activiy modficiaon.\par \par ***has  had reactions to Lidocaine in the past***\par \par 5/19/22 - had R CSI (4/25z) with some temp relief, had mri right shoulder, has been icing and mod activty for b/l shoulders

## 2022-05-19 NOTE — IMAGING
[de-identified] : \par \par RIGHT SHOULDER\par Inspection: No swelling. \par Palpation: Tenderness is noted at the bicipital groove, anterior and lateral. \par Range of motion: There is pain with range of motion.\par , ER 55, @90ER 90, @90IR 30\par Strength: There is pain and discomfort with strength testing.\par Forward Flexion 4/5. Abduction 4/5.  External Rotation 5-/5 and Internal Rotation 5/5 \par Neurological testings: motor and sensor intact distally.\par Ligament Stability and Special Tests: \par There is positive arc of pain. \par Shoulder apprehension: neg\par Shoulder relocation: neg\par Briceño’s test: pos\par Biceps Active test: neg\par Suggs Labral Shear: neg\par Impingement testing: pos\par Jaylin testing: pos\par Whipple: pos\par Cross Body Adduction: neg\par \par \par \par \par  LEFT SHOULDER\par Inspection: No swelling. \par Palpation: Tenderness is noted at the bicipital groove, anterior and lateral. \par Range of motion: There is pain with range of motion.\par , ER 55, @90ER 90, @90IR 30\par Strength: There is pain and discomfort with strength testing.\par Forward Flexion 4+/5. Abduction 4+/5.  External Rotation 5-/5 and Internal Rotation 5-/5 \par Neurological testings: motor and sensor intact distally.\par Ligament Stability and Special Tests: \par There is positive arc of pain. \par Shoulder apprehension: neg\par Shoulder relocation: neg\par Briceño’s test: pos\par Biceps Active test: neg\par Suggs Labral Shear: neg\par Impingement testing: pos\par Jaylin testing: pos\par Whipple: pos\par Cross Body Adduction: neg\par

## 2022-05-19 NOTE — ASSESSMENT
[FreeTextEntry1] : mri right shoulder 5/9/22 - rtc tendinopathy, bursiits, labral tear, gelnido edeam, bicep tnednonits, chronic ac sep\par \par \par \par - The patient was advised of the diagnosis.  The natural history of the pathology was explained to the patient in layman's terms.  Several different treatment options were discussed and explained including the risks and benefits of both surgical and non-surgical treatments.  All questions and concerns were answered.\par - We will continue conservative treatment with PT, icing, and anti-inflammatory medications.\par - The patient was advised to let pain guide the gradual advancement of activities.\par \par

## 2023-05-05 NOTE — ED ADULT NURSE NOTE - NS ED NURSE DISCH DISPOSITION
[Patient Intake Form Reviewed] : Patient intake form was reviewed [Negative] : Heme/Lymph Transferred Same Histology In Subsequent Stages Text: The pattern and morphology of the tumor is as described in the first stage.

## 2024-06-17 ENCOUNTER — APPOINTMENT (OUTPATIENT)
Dept: OTOLARYNGOLOGY | Facility: CLINIC | Age: 59
End: 2024-06-17
Payer: COMMERCIAL

## 2024-06-17 ENCOUNTER — NON-APPOINTMENT (OUTPATIENT)
Age: 59
End: 2024-06-17

## 2024-06-17 VITALS
HEIGHT: 62 IN | BODY MASS INDEX: 23.92 KG/M2 | SYSTOLIC BLOOD PRESSURE: 113 MMHG | WEIGHT: 130 LBS | HEART RATE: 76 BPM | DIASTOLIC BLOOD PRESSURE: 75 MMHG

## 2024-06-17 DIAGNOSIS — H60.502 UNSPECIFIED ACUTE NONINFECTIVE OTITIS EXTERNA, LEFT EAR: ICD-10-CM

## 2024-06-17 DIAGNOSIS — Z80.9 FAMILY HISTORY OF MALIGNANT NEOPLASM, UNSPECIFIED: ICD-10-CM

## 2024-06-17 DIAGNOSIS — Z78.9 OTHER SPECIFIED HEALTH STATUS: ICD-10-CM

## 2024-06-17 DIAGNOSIS — Z82.49 FAMILY HISTORY OF ISCHEMIC HEART DISEASE AND OTHER DISEASES OF THE CIRCULATORY SYSTEM: ICD-10-CM

## 2024-06-17 DIAGNOSIS — Z83.3 FAMILY HISTORY OF DIABETES MELLITUS: ICD-10-CM

## 2024-06-17 PROCEDURE — 99203 OFFICE O/P NEW LOW 30 MIN: CPT

## 2024-06-17 RX ORDER — MOMETASONE FUROATE 1 MG/G
0.1 CREAM TOPICAL TWICE DAILY
Qty: 1 | Refills: 3 | Status: ACTIVE | COMMUNITY
Start: 2024-06-17 | End: 1900-01-01

## 2024-06-17 RX ORDER — ANASTROZOLE TABLETS 1 MG/1
TABLET ORAL
Refills: 0 | Status: ACTIVE | COMMUNITY

## 2024-06-17 NOTE — REVIEW OF SYSTEMS
[Ear Pain] : ear pain [Negative] : Endocrine [de-identified] : Skin and hair changes  [de-identified] : Sweating at night

## 2024-06-17 NOTE — HISTORY OF PRESENT ILLNESS
[de-identified] : 58 yr old female w pain AS since 6/12, lots of dry flaky skin rare Qtips -otorrhea  +childhood otitis

## 2024-06-17 NOTE — PHYSICAL EXAM
[de-identified] : sl erythema in lateral EAC left [Normal] : mucosa is normal [Midline] : trachea located in midline position

## 2024-06-17 NOTE — REASON FOR VISIT
[Initial Consultation] : an initial consultation for [FreeTextEntry2] : Lt swimmers ear - itchiness, dry skin

## 2024-08-05 ENCOUNTER — APPOINTMENT (OUTPATIENT)
Dept: ORTHOPEDIC SURGERY | Facility: CLINIC | Age: 59
End: 2024-08-05

## 2024-08-05 PROBLEM — G56.02 CARPAL TUNNEL SYNDROME OF LEFT WRIST: Status: ACTIVE | Noted: 2024-08-05

## 2024-08-05 PROBLEM — G56.01 CARPAL TUNNEL SYNDROME OF RIGHT WRIST: Status: ACTIVE | Noted: 2024-08-05

## 2024-08-05 PROBLEM — M65.311 TRIGGER FINGER OF RIGHT THUMB: Status: ACTIVE | Noted: 2024-08-05

## 2024-08-05 PROCEDURE — 20526 THER INJECTION CARP TUNNEL: CPT | Mod: 50,59

## 2024-08-05 PROCEDURE — 20550 NJX 1 TENDON SHEATH/LIGAMENT: CPT | Mod: F5

## 2024-08-05 PROCEDURE — 99204 OFFICE O/P NEW MOD 45 MIN: CPT

## 2024-08-05 PROCEDURE — 99214 OFFICE O/P EST MOD 30 MIN: CPT

## 2024-08-05 NOTE — ASSESSMENT
[FreeTextEntry1] : bilateral carpal tunnel releases  We discussed the recommendation for carpal tunnel surgery- We reviewed that the goal of surgery is to prevent worsening and give the nerve a chance to recover. If symptoms are constant there is a chance that the nerve is already too badly damaged and no longer has the potential to recover.Risks, benefits, and alternatives of surgery discussed with patient (and family).Risks including but not limited to infection, blood loss, tendon damage, muscle damage, nerve damage, stiffness, pain, no resolution of symptoms, CRPS, loss of function, potential for secondary surgery, loss of limb or life.Patient understands and was allowed to voice questions or concerns.They agree to surgery

## 2024-08-05 NOTE — HISTORY OF PRESENT ILLNESS
[6] : 6 [Dull/Aching] : dull/aching [Sharp] : sharp [Tingling] : tingling [Intermittent] : intermittent [Leisure] : leisure [de-identified] :  08/05/2024 :NUHA GIBBONS , a 58 year old female, presents today for caden wrists, right thumb pain,  was told she has a frozen thumb- injected vby Dr Morrissey- also told she has CTS- has constant tingling- been wearing night braces with some relief at night- but still painful- and when she doesn't wear them its very uncomfortable. - constant tingling all day long. thumb still bothering her RHD- licensed   PMHx: Breast ca s/p B mastectomy Meds: None NKDA  [] : no

## 2024-08-05 NOTE — IMAGING
[de-identified] : right hand no swelling or deformity no thenar atrophy right thumb TTP a1 pulley +triggering decreased sensation to the median nerve intact ulnar and radial sensation ain/pin/ulnar motor intact +tinels, phalens and durkans, negative spurling sign palpable pulses with CR<2s full motion to the elbow, shoulder  left hand no swelling or deformity no thenar atrophy full active range of motion decreased sensation to the median nerve intact ulnar and radial sensation ain/pin/ulnar motor intact +tinels, phalens and durkans, negative spurling sign palpable pulses with CR<2s full motion to the elbow, shoulder

## 2024-11-21 RX ORDER — HYDROCODONE BITARTRATE AND ACETAMINOPHEN 5; 325 MG/1; MG/1
5-325 TABLET ORAL
Qty: 10 | Refills: 0 | Status: ACTIVE | COMMUNITY
Start: 2024-11-21 | End: 1900-01-01

## 2024-11-22 ENCOUNTER — APPOINTMENT (OUTPATIENT)
Age: 59
End: 2024-11-22

## 2024-11-22 PROCEDURE — 26055 INCISE FINGER TENDON SHEATH: CPT | Mod: AS,59,F5,RT

## 2024-11-22 PROCEDURE — 29848 WRIST ENDOSCOPY/SURGERY: CPT | Mod: 50

## 2024-11-22 PROCEDURE — 26055 INCISE FINGER TENDON SHEATH: CPT | Mod: 59,F5,RT

## 2024-11-22 PROCEDURE — 29848 WRIST ENDOSCOPY/SURGERY: CPT | Mod: AS,50

## 2024-12-03 ENCOUNTER — NON-APPOINTMENT (OUTPATIENT)
Age: 59
End: 2024-12-03

## 2024-12-04 ENCOUNTER — APPOINTMENT (OUTPATIENT)
Dept: ORTHOPEDIC SURGERY | Facility: CLINIC | Age: 59
End: 2024-12-04
Payer: COMMERCIAL

## 2024-12-04 ENCOUNTER — NON-APPOINTMENT (OUTPATIENT)
Age: 59
End: 2024-12-04

## 2024-12-04 DIAGNOSIS — M65.311 TRIGGER THUMB, RIGHT THUMB: ICD-10-CM

## 2024-12-04 DIAGNOSIS — M25.641 STIFFNESS OF RIGHT HAND, NOT ELSEWHERE CLASSIFIED: ICD-10-CM

## 2024-12-04 DIAGNOSIS — M25.642 STIFFNESS OF LEFT HAND, NOT ELSEWHERE CLASSIFIED: ICD-10-CM

## 2024-12-04 PROCEDURE — 99024 POSTOP FOLLOW-UP VISIT: CPT

## 2025-01-06 ENCOUNTER — APPOINTMENT (OUTPATIENT)
Dept: ORTHOPEDIC SURGERY | Facility: CLINIC | Age: 60
End: 2025-01-06
Payer: COMMERCIAL

## 2025-01-06 DIAGNOSIS — M65.311 TRIGGER THUMB, RIGHT THUMB: ICD-10-CM

## 2025-01-06 DIAGNOSIS — M75.121 COMPLETE ROTATOR CUFF TEAR OR RUPTURE OF RIGHT SHOULDER, NOT SPECIFIED AS TRAUMATIC: ICD-10-CM

## 2025-01-06 DIAGNOSIS — G56.02 CARPAL TUNNEL SYNDROME, LEFT UPPER LIMB: ICD-10-CM

## 2025-01-06 PROCEDURE — 99024 POSTOP FOLLOW-UP VISIT: CPT

## 2025-01-06 RX ORDER — DICLOFENAC SODIUM 10 MG/G
1 GEL TOPICAL DAILY
Qty: 1 | Refills: 3 | Status: ACTIVE | COMMUNITY
Start: 2025-01-06 | End: 1900-01-01

## 2025-01-24 ENCOUNTER — APPOINTMENT (OUTPATIENT)
Dept: PAIN MANAGEMENT | Facility: CLINIC | Age: 60
End: 2025-01-24

## 2025-07-23 ENCOUNTER — APPOINTMENT (OUTPATIENT)
Dept: ORTHOPEDIC SURGERY | Facility: CLINIC | Age: 60
End: 2025-07-23

## 2025-07-23 DIAGNOSIS — M65.312 TRIGGER THUMB, LEFT THUMB: ICD-10-CM

## 2025-07-23 PROCEDURE — 20550 NJX 1 TENDON SHEATH/LIGAMENT: CPT | Mod: FA

## 2025-07-23 PROCEDURE — 99213 OFFICE O/P EST LOW 20 MIN: CPT | Mod: 25

## 2025-07-23 PROCEDURE — 73110 X-RAY EXAM OF WRIST: CPT | Mod: LT
